# Patient Record
Sex: FEMALE | Race: WHITE | NOT HISPANIC OR LATINO | URBAN - METROPOLITAN AREA
[De-identification: names, ages, dates, MRNs, and addresses within clinical notes are randomized per-mention and may not be internally consistent; named-entity substitution may affect disease eponyms.]

---

## 2020-07-25 ENCOUNTER — TELEPHONE ENCOUNTER (OUTPATIENT)
Dept: URBAN - METROPOLITAN AREA CLINIC 13 | Facility: CLINIC | Age: 65
End: 2020-07-25

## 2020-07-25 RX ORDER — POLYETHYLENE GLYCOL 3350, SODIUM CHLORIDE, SODIUM BICARBONATE AND POTASSIUM CHLORIDE WITH LEMON FLAVOR 420; 11.2; 5.72; 1.48 G/4L; G/4L; G/4L; G/4L
TAKE 1/2 GALLON AT 5:00 PM DAY BEFORE PROCEDURE, TAKE SECOND 1/2 OF GALLON 6 HRS PRIOR TO PROCEDURE POWDER, FOR SOLUTION ORAL
Qty: 1 | Refills: 0 | OUTPATIENT
Start: 2015-05-28 | End: 2015-08-14

## 2020-07-26 ENCOUNTER — TELEPHONE ENCOUNTER (OUTPATIENT)
Dept: URBAN - METROPOLITAN AREA CLINIC 13 | Facility: CLINIC | Age: 65
End: 2020-07-26

## 2020-07-26 RX ORDER — LEVOTHYROXINE SODIUM 75 UG/1
TAKE 1 TABLET DAILY TABLET ORAL
Refills: 0 | Status: ACTIVE | COMMUNITY
Start: 2015-05-28

## 2020-07-26 RX ORDER — MUPIROCIN 20 MG/G
APPLY SPARINGLY TO AFFECTED AREA(S) TWICE DAILY OINTMENT TOPICAL
Refills: 0 | Status: ACTIVE | COMMUNITY
Start: 2015-05-28

## 2020-07-26 RX ORDER — METFORMIN HYDROCHLORIDE 500 MG/1
TAKE 1 TABLET DAILY WITH FOOD TABLET, COATED ORAL
Refills: 0 | Status: ACTIVE | COMMUNITY
Start: 2015-05-28

## 2020-07-26 RX ORDER — OMEPRAZOLE 20 MG/1
TAKE 1 TABLET DAILY TABLET, DELAYED RELEASE ORAL
Refills: 0 | Status: ACTIVE | COMMUNITY

## 2020-07-26 RX ORDER — VALSARTAN 80 MG/1
TAKE 1 TABLET DAILY TABLET ORAL
Refills: 0 | Status: ACTIVE | COMMUNITY
Start: 2015-05-28

## 2020-11-30 ENCOUNTER — TELEPHONE ENCOUNTER (OUTPATIENT)
Dept: URBAN - METROPOLITAN AREA CLINIC 113 | Facility: CLINIC | Age: 65
End: 2020-11-30

## 2020-12-30 ENCOUNTER — WEB ENCOUNTER (OUTPATIENT)
Dept: URBAN - METROPOLITAN AREA CLINIC 113 | Facility: CLINIC | Age: 65
End: 2020-12-30

## 2020-12-30 ENCOUNTER — OFFICE VISIT (OUTPATIENT)
Dept: URBAN - METROPOLITAN AREA CLINIC 113 | Facility: CLINIC | Age: 65
End: 2020-12-30
Payer: COMMERCIAL

## 2020-12-30 ENCOUNTER — LAB OUTSIDE AN ENCOUNTER (OUTPATIENT)
Dept: URBAN - METROPOLITAN AREA CLINIC 113 | Facility: CLINIC | Age: 65
End: 2020-12-30

## 2020-12-30 VITALS
WEIGHT: 271 LBS | BODY MASS INDEX: 46.26 KG/M2 | DIASTOLIC BLOOD PRESSURE: 86 MMHG | HEART RATE: 83 BPM | TEMPERATURE: 97.5 F | HEIGHT: 64 IN | SYSTOLIC BLOOD PRESSURE: 139 MMHG

## 2020-12-30 DIAGNOSIS — R74.8 ELEVATED LIVER ENZYMES: ICD-10-CM

## 2020-12-30 DIAGNOSIS — K21.9 GASTROESOPHAGEAL REFLUX DISEASE, UNSPECIFIED WHETHER ESOPHAGITIS PRESENT: ICD-10-CM

## 2020-12-30 DIAGNOSIS — K76.0 FATTY LIVER: ICD-10-CM

## 2020-12-30 DIAGNOSIS — Z86.010 HISTORY OF ADENOMATOUS POLYP OF COLON: ICD-10-CM

## 2020-12-30 PROCEDURE — 99204 OFFICE O/P NEW MOD 45 MIN: CPT | Performed by: NURSE PRACTITIONER

## 2020-12-30 PROCEDURE — G8427 DOCREV CUR MEDS BY ELIG CLIN: HCPCS | Performed by: NURSE PRACTITIONER

## 2020-12-30 PROCEDURE — G8482 FLU IMMUNIZE ORDER/ADMIN: HCPCS | Performed by: NURSE PRACTITIONER

## 2020-12-30 PROCEDURE — G9903 PT SCRN TBCO ID AS NON USER: HCPCS | Performed by: NURSE PRACTITIONER

## 2020-12-30 RX ORDER — SODIUM, POTASSIUM,MAG SULFATES 17.5-3.13G
354 ML SOLUTION, RECONSTITUTED, ORAL ORAL ONCE
Qty: 354 MILLILITER | Refills: 0 | OUTPATIENT

## 2020-12-30 RX ORDER — MULTIVITAMIN
1 TABLET TABLET ORAL
Status: ACTIVE | COMMUNITY

## 2020-12-30 RX ORDER — AMOXICILLIN AND CLAVULANATE POTASSIUM 125; 31.25 MG/5ML; MG/5ML
10 ML FOR SUSPENSION ORAL TWICE DAILY
Status: ACTIVE | COMMUNITY

## 2020-12-30 RX ORDER — METFORMIN HYDROCHLORIDE 500 MG/1
TAKE 1 TABLET DAILY WITH FOOD TABLET, COATED ORAL
Refills: 0 | Status: ACTIVE | COMMUNITY
Start: 2015-05-28

## 2020-12-30 RX ORDER — LEVOTHYROXINE SODIUM 175 UG/1
TAKE 1 TABLET DAILY TABLET ORAL
Refills: 0 | Status: ACTIVE | COMMUNITY
Start: 2015-05-28

## 2020-12-30 RX ORDER — IBUPROFEN SODIUM 256 MG/1
1 TABLET WITH FOOD OR MILK AS NEEDED TABLET, COATED ORAL
Status: ACTIVE | COMMUNITY

## 2020-12-30 RX ORDER — BUPROPION HYDROCHLORIDE 150 MG/1
1 TABLET IN THE MORNING TABLET, EXTENDED RELEASE ORAL ONCE A DAY
Status: ACTIVE | COMMUNITY

## 2020-12-30 RX ORDER — OLMESARTAN MEDOXOMIL AND HYDROCHLOROTHIAZIDE 20/12.5 20; 12.5 MG/1; MG/1
1 TABLET TABLET ORAL ONCE A DAY
Status: ACTIVE | COMMUNITY

## 2020-12-30 NOTE — HPI-OTHER HISTORIES
Right upper quadrant ultrasound with elastography 11/24/2020 : Hepatic cyst and coarsened hepatic echotexture with elevated shear wave medium velocity of 2.49 which can be seen with advanced fibrosis and/or cirrhosis.  Correlation with hepatic serologies recommended.    Labs  10/26/2020: GGT 65.  TSH 0.396, free T4 1.64.  Lipid panel normal with exception of , non-.  BMP normal with the exception of glucose 140.  LFTs: TB 0.7, , ALT 23, AST 50.   9/30/2020: Hemoglobin A1c 6.6.  Colonoscopy 8/14/2015 : Good prep, removal of a 10 mm a sending sessile serrated adenoma, removal of an 8 mm cecal sessile serrated adenoma, removal of a 3 mm sigmoid sessile hyperplastic polyp, sigmoid diverticulosis, moderate nonbleeding internal hemorrhoids.

## 2020-12-30 NOTE — HPI-TODAY'S VISIT:
This is a 65-year-old female with a history of hypothyroidism, hyperlipidemia, hypertension, internal hemorrhoids, and adenomatous colon polyps overdue surveillance referred from Dr. Claros for evaluation of hepatic fibrosis.  She was last seen for a screening colonoscopy in August 2015.   She has a history of acid reflux which is controlled with over-the-counter Prevacid daily.  She denies any other abdominal symptoms.  Currently, she is experiencing loose stools while taking Augmentin.  She has not had a colonoscopy since 2015.  She is diabetic.  She reports her blood glucose levels have been between 95 and 103 since October.  She infrequently drinks alcohol reporting that she drinks less than once a week.  She has been vaccinated in the past for hepatitis A and B.  She is engaged efforts at weight reduction in his recently lost 17 pounds.

## 2021-01-25 ENCOUNTER — CLAIMS CREATED FROM THE CLAIM WINDOW (OUTPATIENT)
Dept: URBAN - METROPOLITAN AREA CLINIC 4 | Facility: CLINIC | Age: 66
End: 2021-01-25
Payer: COMMERCIAL

## 2021-01-25 ENCOUNTER — OFFICE VISIT (OUTPATIENT)
Dept: URBAN - METROPOLITAN AREA SURGERY CENTER 25 | Facility: SURGERY CENTER | Age: 66
End: 2021-01-25
Payer: COMMERCIAL

## 2021-01-25 DIAGNOSIS — D12.3 ADENOMA OF TRANSVERSE COLON: ICD-10-CM

## 2021-01-25 DIAGNOSIS — K63.89 OTHER SPECIFIED DISEASES OF INTESTINE: ICD-10-CM

## 2021-01-25 DIAGNOSIS — Z86.010 H/O ADENOMATOUS POLYP OF COLON: ICD-10-CM

## 2021-01-25 PROCEDURE — 45385 COLONOSCOPY W/LESION REMOVAL: CPT | Performed by: INTERNAL MEDICINE

## 2021-01-25 PROCEDURE — G8907 PT DOC NO EVENTS ON DISCHARG: HCPCS | Performed by: INTERNAL MEDICINE

## 2021-01-25 PROCEDURE — 88305 TISSUE EXAM BY PATHOLOGIST: CPT | Performed by: PATHOLOGY

## 2021-01-25 RX ORDER — IBUPROFEN SODIUM 256 MG/1
1 TABLET WITH FOOD OR MILK AS NEEDED TABLET, COATED ORAL
Status: ACTIVE | COMMUNITY

## 2021-01-25 RX ORDER — BUPROPION HYDROCHLORIDE 150 MG/1
1 TABLET IN THE MORNING TABLET, EXTENDED RELEASE ORAL ONCE A DAY
Status: ACTIVE | COMMUNITY

## 2021-01-25 RX ORDER — OLMESARTAN MEDOXOMIL AND HYDROCHLOROTHIAZIDE 20/12.5 20; 12.5 MG/1; MG/1
1 TABLET TABLET ORAL ONCE A DAY
Status: ACTIVE | COMMUNITY

## 2021-01-25 RX ORDER — MULTIVITAMIN
1 TABLET TABLET ORAL
Status: ACTIVE | COMMUNITY

## 2021-01-25 RX ORDER — METFORMIN HYDROCHLORIDE 500 MG/1
TAKE 1 TABLET DAILY WITH FOOD TABLET, COATED ORAL
Refills: 0 | Status: ACTIVE | COMMUNITY
Start: 2015-05-28

## 2021-01-25 RX ORDER — AMOXICILLIN AND CLAVULANATE POTASSIUM 125; 31.25 MG/5ML; MG/5ML
10 ML FOR SUSPENSION ORAL TWICE DAILY
Status: ACTIVE | COMMUNITY

## 2021-01-25 RX ORDER — LEVOTHYROXINE SODIUM 175 UG/1
TAKE 1 TABLET DAILY TABLET ORAL
Refills: 0 | Status: ACTIVE | COMMUNITY
Start: 2015-05-28

## 2021-01-25 RX ORDER — SODIUM, POTASSIUM,MAG SULFATES 17.5-3.13G
354 ML SOLUTION, RECONSTITUTED, ORAL ORAL ONCE
Qty: 354 MILLILITER | Refills: 0 | Status: ACTIVE | COMMUNITY

## 2021-02-23 ENCOUNTER — OFFICE VISIT (OUTPATIENT)
Dept: URBAN - METROPOLITAN AREA CLINIC 113 | Facility: CLINIC | Age: 66
End: 2021-02-23

## 2021-02-23 RX ORDER — MULTIVITAMIN
1 TABLET TABLET ORAL
Status: ACTIVE | COMMUNITY

## 2021-02-23 RX ORDER — SODIUM, POTASSIUM,MAG SULFATES 17.5-3.13G
354 ML SOLUTION, RECONSTITUTED, ORAL ORAL ONCE
Qty: 354 MILLILITER | Refills: 0 | Status: DISCONTINUED | COMMUNITY

## 2021-02-23 RX ORDER — OLMESARTAN MEDOXOMIL AND HYDROCHLOROTHIAZIDE 20/12.5 20; 12.5 MG/1; MG/1
1 TABLET TABLET ORAL ONCE A DAY
Status: ACTIVE | COMMUNITY

## 2021-02-23 RX ORDER — IBUPROFEN SODIUM 256 MG/1
1 TABLET WITH FOOD OR MILK AS NEEDED TABLET, COATED ORAL
Status: DISCONTINUED | COMMUNITY

## 2021-02-23 RX ORDER — LEVOTHYROXINE SODIUM 175 UG/1
TAKE 1 TABLET DAILY TABLET ORAL
Refills: 0 | Status: ACTIVE | COMMUNITY
Start: 2015-05-28

## 2021-02-23 RX ORDER — AMOXICILLIN AND CLAVULANATE POTASSIUM 125; 31.25 MG/5ML; MG/5ML
10 ML FOR SUSPENSION ORAL TWICE DAILY
Status: DISCONTINUED | COMMUNITY

## 2021-02-23 RX ORDER — BUPROPION HYDROCHLORIDE 150 MG/1
1 TABLET IN THE MORNING TABLET, EXTENDED RELEASE ORAL ONCE A DAY
Status: ACTIVE | COMMUNITY

## 2021-02-23 RX ORDER — METFORMIN HYDROCHLORIDE 500 MG/1
TAKE 1 TABLET DAILY WITH FOOD TABLET, COATED ORAL
Refills: 0 | Status: ACTIVE | COMMUNITY
Start: 2015-05-28

## 2021-02-23 NOTE — HPI-TODAY'S VISIT:
This is a 65-year-old female with a history of hypertension, hypothyroidism, diabetes, obesity, colon diverticulosis, and adenomatous colon polyps presenting for follow-up after a surveillance colonoscopy.  She was referred by Dr. Claros in November 2020 for evaluation of hepatic fibrosis. Colonoscopy 1/25/2021:BB PS 9, sigmoid and descending diverticulosis, removal of 3 sessile sigmoid/transverse/hepatic flexure 4 to 7 mm polyps, mild grade 1 nonbleeding internal hemorrhoids, otherwise normal.  Pathology reported the transverse and sigmoid polyps were hyperplastic in the hepatic flexure polyp was a sessile serrated adenoma.  Surveillance planned in 2026.  Labs 11/25/2020 alpha-1 antitrypsin 84.  SHANITA negative.  Ceruloplasmin 25.9.  Ferritin 174.  Hepatitis A antibody total, hepatitis B surface antigen, hepatitis B E antigen, hepatitis B core antibody total, hepatitis B E antibody, hepatitis B surface antibody, hepatitis C antibody negative or nonreactive.  PT 12.1, INR 1.1.  ASMA negative.  SHANITA negative.  10/26/2020 GGT 65.  TSH 0.396, free T4 1.64.  Lipid panel normal.  BMP normal with exception of glucose 140.  LFTs: TB 0.7, , ALT 23, AST 50.  Hemoglobin A1c 6.6. Right upper quadrant ultrasound with shear wave elastography 11/24/2020:Hepatic cyst and coarsened hepatic echotexture with elevated shear wave medium velocity which can be seen with advanced fibrosis and/or cirrhosis.  Previous cholecystectomy.

## 2021-04-05 ENCOUNTER — OFFICE VISIT (OUTPATIENT)
Dept: URBAN - METROPOLITAN AREA CLINIC 113 | Facility: CLINIC | Age: 66
End: 2021-04-05

## 2021-04-05 NOTE — HPI-TODAY'S VISIT:
This is a 65-year-old female with a history of hypertension, hypothyroidism, diabetes, obesity, colon diverticulosis, and adenomatous colon polyps presenting for follow-up after a surveillance colonoscopy.  She was referred by Dr. Clarso in November 2020 for evaluation of hepatic fibrosis. Colonoscopy 1/25/2021:BB PS 9, sigmoid and descending diverticulosis, removal of 3 sessile sigmoid/transverse/hepatic flexure 4 to 7 mm polyps, mild grade 1 nonbleeding internal hemorrhoids, otherwise normal.  Pathology reported the transverse and sigmoid polyps were hyperplastic in the hepatic flexure polyp was a sessile serrated adenoma.  Surveillance planned in 2026.  Labs 11/25/2020 alpha-1 antitrypsin 84.  SHANITA negative.  Ceruloplasmin 25.9.  Ferritin 174.  Hepatitis A antibody total, hepatitis B surface antigen, hepatitis B E antigen, hepatitis B core antibody total, hepatitis B E antibody, hepatitis B surface antibody, hepatitis C antibody negative or nonreactive.  PT 12.1, INR 1.1.  ASMA negative.  SHANITA negative.  10/26/2020 GGT 65.  TSH 0.396, free T4 1.64.  Lipid panel normal.  BMP normal with exception of glucose 140.  LFTs: TB 0.7, , ALT 23, AST 50.  Hemoglobin A1c 6.6. Right upper quadrant ultrasound with shear wave elastography 11/24/2020:Hepatic cyst and coarsened hepatic echotexture with elevated shear wave medium velocity which can be seen with advanced fibrosis and/or cirrhosis.  Previous cholecystectomy.

## 2021-06-09 ENCOUNTER — WEB ENCOUNTER (OUTPATIENT)
Dept: URBAN - METROPOLITAN AREA CLINIC 113 | Facility: CLINIC | Age: 66
End: 2021-06-09

## 2021-06-09 ENCOUNTER — DASHBOARD ENCOUNTERS (OUTPATIENT)
Age: 66
End: 2021-06-09

## 2021-06-09 ENCOUNTER — OFFICE VISIT (OUTPATIENT)
Dept: URBAN - METROPOLITAN AREA CLINIC 113 | Facility: CLINIC | Age: 66
End: 2021-06-09
Payer: COMMERCIAL

## 2021-06-09 VITALS
DIASTOLIC BLOOD PRESSURE: 89 MMHG | WEIGHT: 270 LBS | RESPIRATION RATE: 20 BRPM | BODY MASS INDEX: 46.1 KG/M2 | SYSTOLIC BLOOD PRESSURE: 164 MMHG | HEIGHT: 64 IN | TEMPERATURE: 97.7 F | HEART RATE: 72 BPM

## 2021-06-09 DIAGNOSIS — K57.30 COLON, DIVERTICULOSIS: ICD-10-CM

## 2021-06-09 DIAGNOSIS — K76.0 FATTY LIVER: ICD-10-CM

## 2021-06-09 DIAGNOSIS — Z86.010 HISTORY OF ADENOMATOUS POLYP OF COLON: ICD-10-CM

## 2021-06-09 DIAGNOSIS — R74.8 ELEVATED LIVER ENZYMES: ICD-10-CM

## 2021-06-09 PROCEDURE — 99213 OFFICE O/P EST LOW 20 MIN: CPT | Performed by: INTERNAL MEDICINE

## 2021-06-09 RX ORDER — OLMESARTAN MEDOXOMIL 40 MG/1
1 TABLET TABLET ORAL ONCE A DAY
Status: ACTIVE | COMMUNITY

## 2021-06-09 RX ORDER — LEVOTHYROXINE SODIUM 175 UG/1
TAKE 1 TABLET DAILY TABLET ORAL
Refills: 0 | Status: ACTIVE | COMMUNITY
Start: 2015-05-28

## 2021-06-09 RX ORDER — METFORMIN HYDROCHLORIDE 500 MG/1
TAKE 1 TABLET DAILY WITH FOOD TABLET, COATED ORAL
Refills: 0 | Status: ACTIVE | COMMUNITY
Start: 2015-05-28

## 2021-06-09 RX ORDER — OLMESARTAN MEDOXOMIL AND HYDROCHLOROTHIAZIDE 40/12.5 40; 12.5 MG/1; MG/1
1 TABLET TABLET ORAL ONCE A DAY
Status: ON HOLD | COMMUNITY

## 2021-06-09 RX ORDER — DICLOFENAC SODIUM 10 MG/G
AS DIRECTED GEL TOPICAL
Status: ACTIVE | COMMUNITY

## 2021-06-09 RX ORDER — BUPROPION HYDROCHLORIDE 150 MG/1
1 TABLET IN THE MORNING TABLET, EXTENDED RELEASE ORAL ONCE A DAY
Status: ACTIVE | COMMUNITY

## 2021-06-09 RX ORDER — MULTIVITAMIN
1 TABLET TABLET ORAL
Status: ACTIVE | COMMUNITY

## 2021-06-09 NOTE — HPI-OTHER HISTORIES
Colonoscopy 1/25/2021:BBPS 9, sigmoid and descending diverticulosis, removal of 3 sessile sigmoid/transverse/hepatic flexure 4 to 7 mm polyps, mild grade 1 nonbleeding internal hemorrhoids, otherwise normal.  Pathology reported the transverse and sigmoid polyps were hyperplastic in the hepatic flexure polyp was a sessile serrated adenoma.  Surveillance planned in 2024.   Labs  4/13/2021 TSH 0.708, free T4 1.24.  BMP normal with exception of creatinine 1.4.  LFTs: TB 0.4, , ALT 21, AST 50.  Lipid panel normal with exception of triglycerides 158, , non-HDL cholesterol 141.  Hemoglobin A1c 6.1.  CBC: WBC 6.6, hemoglobin 13.3, MCV 89.7, platelet 129.  Urinalysis normal with exception of trace leukocytes. 11/25/2020 alpha-1 antitrypsin 84.  SHANITA negative.  Ceruloplasmin 25.9.  Ferritin 174.  Hepatitis A antibody total, hepatitis B surface antigen, hepatitis BE antigen, hepatitis B core antibody total, hepatitis BE antibody, hepatitis B surface antibody, hepatitis C antibody negative or nonreactive.  PT 12.1, INR 1.1.  ASMA negative.  SHANITA negative.   10/26/2020 GGT 65.  TSH 0.396, free T4 1.64.  Lipid panel normal.  BMP normal with exception of glucose 140.  LFTs: TB 0.7, , ALT 23, AST 50.  Hemoglobin A1c 6.6. Right upper quadrant ultrasound with shear wave elastography 11/24/2020:Hepatic cyst and coarsened hepatic echotexture with elevated shear wave medium velocity which can be seen with advanced fibrosis and/or cirrhosis.  Previous cholecystectomy.

## 2021-06-09 NOTE — HPI-TODAY'S VISIT:
This is a 65-year-old female with a history of hypertension, hypothyroidism, diabetes, obesity, colon diverticulosis, and adenomatous colon polyps presenting for follow-up after a surveillance colonoscopy.  She was referred by Dr. Claros in November 2020 for evaluation of hepatic fibrosis.  She was last seen 12/30/2020.  She had been referred from Dr. Claros for evaluation of hepatic fibrosis.  Acid reflux was well controlled with over-the-counter Prevacid daily.  She reported good control of acid reflux.  She had been vaccinated for hepatitis A and B.  A recent right upper quadrant ultrasound with shear wave elastography demonstrated findings suspicious for advanced fibrosis or cirrhosis.  It was discussed that elevated liver enzymes were likely associated with fatty liver.  Additional labs were ordered to assess for autoimmune, hereditary, viral, or metabolic sources of liver disease.  She was instructed to continue efforts at weight reduction, cholesterol control, and diabetes control and to minimize alcohol use.  Due to a history of removal of serrated adenomas on prior colonoscopy, she was scheduled for a surveillance exam.  She was to continue lansoprazole 15 mg daily for GERD. She states she is doing well.  She was vaccinated for hepatitis B in 2018/2019.  She takes lansoprazole 15 mg as needed for acid reflux.  She denies any other abdominal symptoms.  She drinks 4 alcoholic beverages containing 1 shot of alcohol per week.

## 2021-06-12 PROBLEM — 235595009: Status: ACTIVE | Noted: 2020-12-30

## 2021-06-13 PROBLEM — 733657002: Status: ACTIVE | Noted: 2021-06-12

## 2021-06-13 PROBLEM — 707724006: Status: ACTIVE | Noted: 2020-12-30

## 2021-06-13 PROBLEM — 197321007: Status: ACTIVE | Noted: 2020-12-30

## 2021-06-13 PROBLEM — 429047008: Status: ACTIVE | Noted: 2020-12-30

## 2021-11-01 ENCOUNTER — LAB OUTSIDE AN ENCOUNTER (OUTPATIENT)
Dept: URBAN - METROPOLITAN AREA CLINIC 113 | Facility: CLINIC | Age: 66
End: 2021-11-01